# Patient Record
Sex: FEMALE | Race: WHITE | NOT HISPANIC OR LATINO | Employment: UNEMPLOYED | URBAN - METROPOLITAN AREA
[De-identification: names, ages, dates, MRNs, and addresses within clinical notes are randomized per-mention and may not be internally consistent; named-entity substitution may affect disease eponyms.]

---

## 2017-07-21 ENCOUNTER — OFFICE VISIT (OUTPATIENT)
Dept: CARDIOLOGY | Facility: CLINIC | Age: 57
End: 2017-07-21

## 2017-07-21 VITALS
WEIGHT: 184 LBS | HEIGHT: 68 IN | BODY MASS INDEX: 27.89 KG/M2 | SYSTOLIC BLOOD PRESSURE: 122 MMHG | DIASTOLIC BLOOD PRESSURE: 66 MMHG | HEART RATE: 80 BPM

## 2017-07-21 DIAGNOSIS — C18.9 COLON CANCER METASTASIZED TO INTRA-ABDOMINAL LYMPH NODE (HCC): ICD-10-CM

## 2017-07-21 DIAGNOSIS — C77.2 COLON CANCER METASTASIZED TO INTRA-ABDOMINAL LYMPH NODE (HCC): ICD-10-CM

## 2017-07-21 DIAGNOSIS — I48.19 PERSISTENT ATRIAL FIBRILLATION (HCC): Primary | ICD-10-CM

## 2017-07-21 PROCEDURE — 93000 ELECTROCARDIOGRAM COMPLETE: CPT | Performed by: INTERNAL MEDICINE

## 2017-07-21 PROCEDURE — 99204 OFFICE O/P NEW MOD 45 MIN: CPT | Performed by: INTERNAL MEDICINE

## 2017-07-21 RX ORDER — PROCHLORPERAZINE MALEATE 10 MG
10 TABLET ORAL EVERY 6 HOURS PRN
COMMUNITY

## 2017-07-21 RX ORDER — DOCUSATE SODIUM 100 MG/1
100 CAPSULE, LIQUID FILLED ORAL 2 TIMES DAILY PRN
COMMUNITY

## 2017-07-21 NOTE — PROGRESS NOTES
Danielle Bauer  1960  Date of Office Visit: 07/21/2017  Encounter Provider: Matt Wills MD  Place of Service: [facility]      CHIEF COMPLAINT:  Persistent atrial fibrillation  Metastatic colon cancer    HISTORY OF PRESENT ILLNESS: Very pleasant 56-year-old female with a medical history of recently diagnosed stage IV cecal cancer, and atrial fibrillation that was recently documented at the time of her going in for a port.  In addition she stated that when she had her colonoscopy approximately one month ago she had an irregular rhythm at that time.  She does not refer being told that she had atrial fibrillation.  She denies any chest pain, dyspnea on exertion or sustained tachycardia.  She reportedly has been very tired and weak starting back in April and then more recently had some nausea and vomiting.  Around June she began noticing that she was losing weight and having abdominal cramping that she described as moderate in intensity, central, and not relieved with eating.  She was then noted on CT of the abdomen and pelvis to have ascites along with a right ovarian mass along with left ovarian mass.  She had a colonoscopy showing adenocarcinoma of the cecum.  She also had a paracentesis showing that she had peritoneal metastasis.  She was placed on aspirin along with metoprolol tartrate and states that since that time she has not noticed any palpitations or tachycardia.  She presents today for evaluation of possible cardioversion.  She had a transthoracic echocardiogram performed during her hospitalization that showed her to have a normal left ventricular size and systolic function, mildly dilated left atrium and no significant valvular heart disease.      Review of Systems   Constitution: Negative for fever, weakness and malaise/fatigue.   HENT: Negative for nosebleeds and sore throat.    Eyes: Negative for blurred vision and double vision.  "  Cardiovascular: Negative for chest pain, claudication, palpitations and syncope.   Respiratory: Negative for cough, shortness of breath and snoring.    Endocrine: Negative for cold intolerance, heat intolerance and polydipsia.   Skin: Negative for itching, poor wound healing and rash.   Musculoskeletal: Negative for joint pain, joint swelling, muscle weakness and myalgias.   Gastrointestinal: Negative for abdominal pain, melena, nausea and vomiting.   Neurological: Negative for light-headedness, loss of balance, seizures and vertigo.   Psychiatric/Behavioral: Negative for altered mental status and depression.          Past Medical History:   Diagnosis Date   • Arthritis    • Atrial fibrillation    • Cecal cancer    • Iron deficiency anemia    • Krukenberg tumor of right ovary    • Migraine    • Pneumonia        The following portions of the patient's history were reviewed and updated as appropriate: Social history , Family history and Surgical history     No current outpatient prescriptions on file prior to visit.     No current facility-administered medications on file prior to visit.        No Known Allergies    Vitals:    07/21/17 1621   BP: 122/66   Pulse: 80   Weight: 184 lb (83.5 kg)   Height: 68\" (172.7 cm)     Physical Exam   Constitutional: She is oriented to person, place, and time. She appears well-developed and well-nourished.   HENT:   Head: Normocephalic and atraumatic.   Eyes: Conjunctivae and EOM are normal. No scleral icterus.   Neck: Normal range of motion. Neck supple. Normal carotid pulses, no hepatojugular reflux and no JVD present. Carotid bruit is not present. No tracheal deviation present. No thyromegaly present.   Cardiovascular: Normal rate.  An irregularly irregular rhythm present. Exam reveals no gallop and no friction rub.    No murmur heard.  Pulses:       Carotid pulses are 2+ on the right side, and 2+ on the left side.       Radial pulses are 2+ on the right side, and 2+ on the left " side.        Femoral pulses are 2+ on the right side, and 2+ on the left side.       Dorsalis pedis pulses are 2+ on the right side, and 2+ on the left side.        Posterior tibial pulses are 2+ on the right side, and 2+ on the left side.   Pulmonary/Chest: Breath sounds normal. No respiratory distress. She has no decreased breath sounds. She has no wheezes. She has no rhonchi. She has no rales. She exhibits no tenderness.   Abdominal: Soft. Bowel sounds are normal. She exhibits no distension. There is no tenderness. There is no rebound.   Musculoskeletal: She exhibits no edema or deformity.   Neurological: She is alert and oriented to person, place, and time. She has normal strength. No sensory deficit.   Skin: No rash noted. No erythema.   Psychiatric: She has a normal mood and affect. Her behavior is normal.       No components found for: CBC  No results found for: CMP  No components found for: LIPID  No results found for: BMP      ECG 12 Lead  Date/Time: 7/21/2017 4:42 PM  Performed by: PERLITA SHETH  Authorized by: PERLITA SHETH   Comparison: compared with previous ECG from 7/14/2017  Comparison to previous ECG: Heart rate has decreased 29 bpm  Rhythm: atrial fibrillation  Rate: normal  ST Segments: ST segments normal  T Waves: T waves normal  QRS axis: normal  Clinical impression: abnormal ECG            DISCUSSION/SUMMARY  56-year-old female with a medical history of recently diagnosed stage IV cecal cancer, and atrial fibrillation that was recently documented at the time of her going in for a port.  It is unclear to me how long she has actually been in atrial fibrillation.  She does not have chest pain, dyspnea or sustained palpitations.  She has a preserved ejection fraction. [id number] score is 1.  She is planning to undergo FOLFOX6 which she has currently started.      1.  Atrial fibrillation with a controlled ventricular rate, persistent.  Unclear duration   -[id number] score is  1   -Considering the degrees of stressors on her body with type of chemotherapy that she is going to get I think there is minimal chance of keeping her in sinus rhythm even if we got her there.   -In addition I would recommend anticoagulation with a direct oral anticoagulant for one month prior to and one month after cardioversion.  With the risk of thrombocytopenia and the presence of a cecal  mass I do not think this would be appropriate   -She has if anything a hyperdynamic left ventricular function and I do not think that the rate controlled atrial fibrillation poses an additional cardiac risk to her, other than her low risk of embolic event  associated with her [id number] score of 1   -I will place a 24-hour Holter monitor on her to ensure that she has adequate ventricular control on the metoprolol therapy.  If so I recommend continuing that along with the aspirin.    2.  Cecal cancer: Per oncology    I will continue to follow.  I will see her back in 3 months.    Atrial Fibrillation and Atrial Flutter  Assessment  • The patient has persistent atrial fibrillation  • This is non-valvular in etiology  • The patient's CHADS2-VASc score is 1  • A EAO5FC3-ZROr score of 1 is considered an intermediate risk for a thromboembolic event  • Aspirin prescribed    Plan  • Continue in atrial fibrillation with rate control  • Continue aspirin for antithrombotic therapy, bleeding issues discussed  • Continue beta blocker for rhythm control

## 2017-08-10 ENCOUNTER — TELEPHONE (OUTPATIENT)
Dept: CARDIOLOGY | Facility: CLINIC | Age: 57
End: 2017-08-10

## 2017-08-10 RX ORDER — METOPROLOL TARTRATE 50 MG/1
TABLET, FILM COATED ORAL
Qty: 270 TABLET | Refills: 3 | Status: SHIPPED | OUTPATIENT
Start: 2017-08-10

## 2017-08-10 NOTE — TELEPHONE ENCOUNTER
----- Message from Matt Wills MD sent at 8/5/2017  8:38 PM EDT -----  Please tell her I reviewed Holter Mitzy would like to increase her Metoprolol tartrate to 75mg bid. No other changes

## 2017-10-30 ENCOUNTER — TELEPHONE (OUTPATIENT)
Dept: CARDIOLOGY | Facility: CLINIC | Age: 57
End: 2017-10-30

## 2017-10-30 NOTE — TELEPHONE ENCOUNTER
FYI: Pt called stating she wanted an appointment at the [place] office instead she was given one at [place] location. Pt states she will call and reschedule appointment, but will not be here today. Thanks, Maryam

## 2017-11-20 ENCOUNTER — OFFICE VISIT (OUTPATIENT)
Dept: CARDIOLOGY | Facility: CLINIC | Age: 57
End: 2017-11-20

## 2017-11-20 VITALS
WEIGHT: 173 LBS | HEART RATE: 92 BPM | DIASTOLIC BLOOD PRESSURE: 80 MMHG | SYSTOLIC BLOOD PRESSURE: 122 MMHG | HEIGHT: 68 IN | BODY MASS INDEX: 26.22 KG/M2

## 2017-11-20 DIAGNOSIS — I48.19 PERSISTENT ATRIAL FIBRILLATION (HCC): Primary | ICD-10-CM

## 2017-11-20 DIAGNOSIS — I26.99 OTHER PULMONARY EMBOLISM WITHOUT ACUTE COR PULMONALE, UNSPECIFIED CHRONICITY (HCC): ICD-10-CM

## 2017-11-20 PROCEDURE — 93000 ELECTROCARDIOGRAM COMPLETE: CPT | Performed by: INTERNAL MEDICINE

## 2017-11-20 PROCEDURE — 99214 OFFICE O/P EST MOD 30 MIN: CPT | Performed by: INTERNAL MEDICINE

## 2017-11-20 RX ORDER — WARFARIN SODIUM 2.5 MG/1
2.5 TABLET ORAL
COMMUNITY

## 2017-11-20 NOTE — PROGRESS NOTES
Danielle Bauer  1960  Date of Office Visit: 11/20/17  Encounter Provider: Matt Wills MD  Place of Service: [facility] CARD[facility]      CHIEF COMPLAINT:  Persistent atrial fibrillation  Metastatic colon cancer    HISTORY OF PRESENT ILLNESS:    56-year-old female with a medical history of recently diagnosed stage IV cecal cancer, and atrial fibrillation that was documented at the time of her going in for a port.  In addition, she stated that when she had her colonoscopy, she had an irregular rhythm at that time.  She does not remember being told that she had atrial fibrillation.  She denies any chest pain, dyspnea on exertion or sustained tachycardia.      Around June 2016, she began noticing that she was losing weight and having abdominal cramping that she described as moderate in intensity, central, and not relieved with eating.  She was then noted on CT of the abdomen and pelvis to have ascites along with a right ovarian mass along with left ovarian mass.  She had a colonoscopy showing adenocarcinoma of the cecum.  She also had a paracentesis showing that she had peritoneal metastasis.  She was placed on aspirin along with metoprolol tartrate and states that since that time she has not noticed any palpitations or tachycardia.      She had a transthoracic echocardiogram performed during her hospitalization that showed her to have a normal left ventricular size and systolic function, mildly dilated left atrium and no significant valvular heart disease.    Unfortunately, in August she was admitted with bilateral pulmonary emboli.  She did not undergo an echocardiogram at that time.  She was placed initially on a direct oral anticoagulant and has since been moved over to coumadin therapy.  There was no comment on the size of her right ventricle at that point in time.  She is tolerating the coumadin therapy without any melena or bright red blood per rectum.  Her last  lab work in January revealed a hematocrit of 36.      She states that overall feels fine and just has mild dyspnea on exertion when she walks greater than 200 feet.  She does not notice tachycardia or palpitations unless she misses her medication.          Review of Systems   Constitution: Negative for fever, weakness and malaise/fatigue.   HENT: Negative for nosebleeds and sore throat.    Eyes: Negative for blurred vision and double vision.   Cardiovascular: Negative for chest pain, claudication, palpitations and syncope.   Respiratory: Negative for cough, shortness of breath and snoring.    Endocrine: Negative for cold intolerance, heat intolerance and polydipsia.   Skin: Negative for itching, poor wound healing and rash.   Musculoskeletal: Negative for joint pain, joint swelling, muscle weakness and myalgias.   Gastrointestinal: Negative for abdominal pain, melena, nausea and vomiting.   Neurological: Negative for light-headedness, loss of balance, seizures and vertigo.   Psychiatric/Behavioral: Negative for altered mental status and depression.          Past Medical History:   Diagnosis Date   • Arthritis    • Atrial fibrillation    • Cecal cancer    • Iron deficiency anemia    • Krukenberg tumor of right ovary    • Migraine    • Pneumonia        The following portions of the patient's history were reviewed and updated as appropriate: Social history , Family history and Surgical history     Current Outpatient Prescriptions on File Prior to Visit   Medication Sig Dispense Refill   • aspirin 81 MG chewable tablet Chew 81 mg Daily.     • docusate sodium (STOOL SOFTENER) 100 MG capsule Take 100 mg by mouth 2 (Two) Times a Day As Needed for Constipation.     • HYDROcodone-acetaminophen (NORCO) 7.5-325 MG per tablet Take 1 tablet by mouth Every 6 (Six) Hours As Needed for Moderate Pain (4-6).     • metoprolol tartrate (LOPRESSOR) 50 MG tablet Take 1 1/2 tablet twice daily 270 tablet 3   • ondansetron (ZOFRAN) 8 MG  "tablet Take  by mouth Every 8 (Eight) Hours As Needed for Nausea or Vomiting.     • prochlorperazine (COMPAZINE) 10 MG tablet Take 10 mg by mouth Every 6 (Six) Hours As Needed for Nausea or Vomiting.       No current facility-administered medications on file prior to visit.        No Known Allergies    Vitals:    11/20/17 1417   BP: 122/80   Pulse: 92   Weight: 173 lb (78.5 kg)   Height: 68\" (172.7 cm)     Physical Exam   Constitutional: She is oriented to person, place, and time. She appears well-developed and well-nourished.   HENT:   Head: Normocephalic and atraumatic.   Eyes: Conjunctivae and EOM are normal. No scleral icterus.   Neck: Normal range of motion. Neck supple. Normal carotid pulses, no hepatojugular reflux and no JVD present. Carotid bruit is not present. No tracheal deviation present. No thyromegaly present.   Cardiovascular: Normal rate.  An irregularly irregular rhythm present. Exam reveals no gallop and no friction rub.    No murmur heard.  Pulses:       Carotid pulses are 2+ on the right side, and 2+ on the left side.       Radial pulses are 2+ on the right side, and 2+ on the left side.        Femoral pulses are 2+ on the right side, and 2+ on the left side.       Dorsalis pedis pulses are 2+ on the right side, and 2+ on the left side.        Posterior tibial pulses are 2+ on the right side, and 2+ on the left side.   Pulmonary/Chest: Breath sounds normal. No respiratory distress. She has no decreased breath sounds. She has no wheezes. She has no rhonchi. She has no rales. She exhibits no tenderness.   Abdominal: Soft. Bowel sounds are normal. She exhibits no distension. There is no tenderness. There is no rebound.   Musculoskeletal: She exhibits no edema or deformity.   Neurological: She is alert and oriented to person, place, and time. She has normal strength. No sensory deficit.   Skin: No rash noted. No erythema.   Psychiatric: She has a normal mood and affect. Her behavior is normal. "       No components found for: CBC  No results found for: CMP  No components found for: LIPID  No results found for: BMP      ECG 12 Lead  Date/Time: 11/20/2017 2:37 PM  Performed by: PERLITA SHETH  Authorized by: PERLITA SHETH   Comparison: compared with previous ECG from 8/27/2017  Similar to previous ECG  Rhythm: atrial fibrillation  Rate: normal  ST Segments: ST segments normal  T Waves: T waves normal  QRS axis: normal  Clinical impression: abnormal ECG            DISCUSSION/SUMMARY  56-year-old female with a medical history of stage IV cecal cancer, and atrial fibrillation that was recently documented at the time of her going in for a port.  It is unclear to me how long she has actually been in atrial fibrillation.  She does not have chest pain, dyspnea or sustained palpitations.  She has a preserved ejection fraction.  She was not placed on anticoagulation due to her cecal cancer, and unfortunately of the bilateral pulmonary emboli.  She is now tolerating Coumadin therapy.  She did not have evidence of RV strain on her hospitalization, however did not have an echocardiogram.  She reports only mild dyspnea on exertion at this point in time.    1.  Atrial fibrillation with a controlled ventricular rate, persistent.  Unclear duration   -[id number] elevated due to pulmonary emboli and female gender   -Considering the degrees of stressors on her body with chemotherapy that she is going to get I think there is minimal chance of keeping her in sinus rhythm even if we got her there.   -Continue anticoagulation.    2.  Cecal cancer: Per oncology    3.  Pulmonary emboli: On anticoagulation   -No DVT.   -Hypercoagulable with malignancy   -Agree with Coumadin therapy.  Allows for reversibility if necessary.   -I will order an echocardiogram to evaluate her right ventricular size and systolic function the setting of prior PE    I will continue to follow.  I will see her back in 3 months.    Atrial  Fibrillation and Atrial Flutter  Assessment  • The patient has persistent atrial fibrillation  • This is non-valvular in etiology  • The patient's CHADS2-VASc score is 3  • A WHL2OI7-OXPe score of 2 or more is considered a high risk for a thromboembolic event  • Aspirin prescribed    Plan  • Continue in atrial fibrillation with rate control  • Continue aspirin for antithrombotic therapy, bleeding issues discussed  • Continue beta blocker for rhythm control